# Patient Record
Sex: FEMALE | Race: WHITE
[De-identification: names, ages, dates, MRNs, and addresses within clinical notes are randomized per-mention and may not be internally consistent; named-entity substitution may affect disease eponyms.]

---

## 2020-07-24 ENCOUNTER — HOSPITAL ENCOUNTER (INPATIENT)
Dept: HOSPITAL 54 - ER | Age: 65
LOS: 10 days | Discharge: HOME | DRG: 885 | End: 2020-08-03
Attending: FAMILY MEDICINE | Admitting: PSYCHIATRY & NEUROLOGY
Payer: MEDICARE

## 2020-07-24 VITALS — DIASTOLIC BLOOD PRESSURE: 63 MMHG | SYSTOLIC BLOOD PRESSURE: 124 MMHG

## 2020-07-24 VITALS — SYSTOLIC BLOOD PRESSURE: 122 MMHG | DIASTOLIC BLOOD PRESSURE: 73 MMHG

## 2020-07-24 VITALS — DIASTOLIC BLOOD PRESSURE: 69 MMHG | SYSTOLIC BLOOD PRESSURE: 124 MMHG

## 2020-07-24 VITALS — BODY MASS INDEX: 23.04 KG/M2 | HEIGHT: 63 IN | WEIGHT: 130 LBS

## 2020-07-24 VITALS — SYSTOLIC BLOOD PRESSURE: 132 MMHG | DIASTOLIC BLOOD PRESSURE: 72 MMHG

## 2020-07-24 DIAGNOSIS — E11.65: ICD-10-CM

## 2020-07-24 DIAGNOSIS — R45.851: ICD-10-CM

## 2020-07-24 DIAGNOSIS — F23: ICD-10-CM

## 2020-07-24 DIAGNOSIS — E78.5: ICD-10-CM

## 2020-07-24 DIAGNOSIS — F41.0: ICD-10-CM

## 2020-07-24 DIAGNOSIS — F32.2: Primary | ICD-10-CM

## 2020-07-24 DIAGNOSIS — E87.1: ICD-10-CM

## 2020-07-24 DIAGNOSIS — E03.9: ICD-10-CM

## 2020-07-24 DIAGNOSIS — Z79.84: ICD-10-CM

## 2020-07-24 DIAGNOSIS — T38.3X1D: ICD-10-CM

## 2020-07-24 DIAGNOSIS — E86.0: ICD-10-CM

## 2020-07-24 RX ADMIN — Medication SCH EACH: at 21:19

## 2020-07-24 RX ADMIN — MIRTAZAPINE SCH MG: 15 TABLET, FILM COATED ORAL at 21:18

## 2020-07-24 RX ADMIN — TEMAZEPAM PRN MG: 7.5 CAPSULE ORAL at 21:18

## 2020-07-24 RX ADMIN — SERTRALINE HYDROCHLORIDE SCH MG: 50 TABLET, FILM COATED ORAL at 12:49

## 2020-07-24 RX ADMIN — ATORVASTATIN CALCIUM SCH MG: 10 TABLET, FILM COATED ORAL at 21:18

## 2020-07-24 RX ADMIN — INSULIN HUMAN PRN UNIT: 100 INJECTION, SOLUTION PARENTERAL at 22:40

## 2020-07-24 NOTE — NUR
gps rn note:

NEW T.O. ORDER FROM DR TERRAZAS MILD ACCU CHECK AC/HS ORDER PLACED AND CARED OUT WILL 
CONTINUE MONITORING

## 2020-07-24 NOTE — NUR
GROUP NOTE:  invited pt to participate in group. Pt stated that she is feeling 
dizzy and does not want to join.

## 2020-07-24 NOTE — NUR
Family Contact: SW called the pts daughter, Ninoska ([phone]), and left a voicemail 
stating that the SW would like to discuss the treatment planning and initial discharge plan.

## 2020-07-24 NOTE — NUR
GPS/LVN ADMISSION NOTES:



RECEIVED 64 YR. OLD FEMALE ON A 5150 HOLD FOR DTS.  PT. TRIED TO OVERDOSE ON HER MEDICATION 
METHFORMIN.  PT. A/O X3.  PT. ORIENTED TO UNIT POLICY, PROTOCOLS, AND PROCEDURES.  SAFETY 
ENVIRONMENT OBSERVED AT ALL TIMES WITH CALL BAUM WITHIN REACH AND BED ALARM ON.  PT. DENIES 
SI/HI AT THIS TIME.  MEDICAL AND PSYCH DRAg MADE AWARE OF PT. ADMISSION.  PT. FAMILY ALSO 
NOTIFIED OF PT. ADMISSION TO UNIT.  WILL CONTINUE TO MONITOR Q 15 MIN FOR SAFETY AND 
BEHAVIOR.

## 2020-07-24 NOTE — NUR
Initial Discharge Plan: Pt currently resides at her home located at [address] [place] [address]; ([phone]). Per pt, she would like to return to her home. FRANKLIN will 
work with the MD and the pt regarding appropriate discharge planning. SW will form a safe 
and proper discharge plan.

## 2020-07-24 NOTE — NUR
GPS RN



PT REFUSED INSULIN SLIDING SCALE COVERAGE BLOOD SUGAR  MG/DL DESPITE EXPLAINING RISKS 
AND BENEFITS PER PT "I DONT WANT INSULIN".OFFERED 3 TIMES PT STILL REFUSE

## 2020-07-25 VITALS — DIASTOLIC BLOOD PRESSURE: 61 MMHG | SYSTOLIC BLOOD PRESSURE: 110 MMHG

## 2020-07-25 VITALS — SYSTOLIC BLOOD PRESSURE: 125 MMHG | DIASTOLIC BLOOD PRESSURE: 58 MMHG

## 2020-07-25 VITALS — SYSTOLIC BLOOD PRESSURE: 120 MMHG | DIASTOLIC BLOOD PRESSURE: 67 MMHG

## 2020-07-25 VITALS — DIASTOLIC BLOOD PRESSURE: 58 MMHG | SYSTOLIC BLOOD PRESSURE: 125 MMHG

## 2020-07-25 LAB
BASOPHILS # BLD AUTO: 0 /CMM (ref 0–0.2)
BASOPHILS NFR BLD AUTO: 0.4 % (ref 0–2)
BUN SERPL-MCNC: 12 MG/DL (ref 7–18)
CALCIUM SERPL-MCNC: 9.1 MG/DL (ref 8.5–10.1)
CHLORIDE SERPL-SCNC: 99 MMOL/L (ref 98–107)
CHOLEST SERPL-MCNC: 160 MG/DL (ref ?–200)
CO2 SERPL-SCNC: 25 MMOL/L (ref 21–32)
CREAT SERPL-MCNC: 0.7 MG/DL (ref 0.6–1.3)
EOSINOPHIL NFR BLD AUTO: 0.8 % (ref 0–6)
GLUCOSE SERPL-MCNC: 147 MG/DL (ref 74–106)
HCT VFR BLD AUTO: 35 % (ref 33–45)
HDLC SERPL-MCNC: 40 MG/DL (ref 40–60)
HGB BLD-MCNC: 11.7 G/DL (ref 11.5–14.8)
LDLC SERPL DIRECT ASSAY-MCNC: 109 MG/DL (ref 0–99)
LYMPHOCYTES NFR BLD AUTO: 2.3 /CMM (ref 0.8–4.8)
LYMPHOCYTES NFR BLD AUTO: 25 % (ref 20–44)
MCHC RBC AUTO-ENTMCNC: 33 G/DL (ref 31–36)
MCV RBC AUTO: 92 FL (ref 82–100)
MONOCYTES NFR BLD AUTO: 0.5 /CMM (ref 0.1–1.3)
MONOCYTES NFR BLD AUTO: 5 % (ref 2–12)
NEUTROPHILS # BLD AUTO: 6.3 /CMM (ref 1.8–8.9)
NEUTROPHILS NFR BLD AUTO: 68.8 % (ref 43–81)
PLATELET # BLD AUTO: 285 /CMM (ref 150–450)
POTASSIUM SERPL-SCNC: 3.8 MMOL/L (ref 3.5–5.1)
RBC # BLD AUTO: 3.85 MIL/UL (ref 4–5.2)
SODIUM SERPL-SCNC: 134 MMOL/L (ref 136–145)
TRIGL SERPL-MCNC: 89 MG/DL (ref 30–150)
WBC NRBC COR # BLD AUTO: 9.2 K/UL (ref 4.3–11)

## 2020-07-25 RX ADMIN — SERTRALINE HYDROCHLORIDE SCH MG: 50 TABLET, FILM COATED ORAL at 12:56

## 2020-07-25 RX ADMIN — INSULIN HUMAN PRN UNIT: 100 INJECTION, SOLUTION PARENTERAL at 17:05

## 2020-07-25 RX ADMIN — Medication SCH EACH: at 17:15

## 2020-07-25 RX ADMIN — MIRTAZAPINE SCH MG: 15 TABLET, FILM COATED ORAL at 21:38

## 2020-07-25 RX ADMIN — INSULIN HUMAN PRN UNIT: 100 INJECTION, SOLUTION PARENTERAL at 17:16

## 2020-07-25 RX ADMIN — Medication SCH EACH: at 21:38

## 2020-07-25 RX ADMIN — TEMAZEPAM PRN MG: 7.5 CAPSULE ORAL at 22:46

## 2020-07-25 RX ADMIN — Medication SCH EACH: at 11:31

## 2020-07-25 RX ADMIN — Medication SCH EACH: at 07:30

## 2020-07-25 RX ADMIN — ATORVASTATIN CALCIUM SCH MG: 10 TABLET, FILM COATED ORAL at 21:39

## 2020-07-25 RX ADMIN — INSULIN HUMAN PRN UNIT: 100 INJECTION, SOLUTION PARENTERAL at 17:06

## 2020-07-25 RX ADMIN — INSULIN HUMAN PRN UNIT: 100 INJECTION, SOLUTION PARENTERAL at 11:42

## 2020-07-25 RX ADMIN — MAGNESIUM HYDROXIDE PRN ML: 400 SUSPENSION ORAL at 12:22

## 2020-07-25 NOTE — NUR
GPS RN NOTE: INSOMNIA



PATIENT STATED THAT SHE IS UNABLE TO SLEEP & REQUESTED TO GET SLEEPING MEDICINE. PRN 
RESTORIL 7.5 MG 1 CAP PO GIVEN AS ORDERED.

## 2020-07-25 NOTE — NUR
GPS RN NOTE: REFUSED LIPITOR



PATIENT REFUSED TO TAKE LIPITOR AS SCHEDULED, SAID," I DO NOT WANT TO TAKE IT, MY 
CHOLESTEROL LEVEL IS GOOD." PT. KEPT REFUSING TO TAKE LIPITOR DESPITE OF RISKS & BENEFITS 
EXPLANATIONS.

## 2020-07-25 NOTE — NUR
GPS RN NOTE: REFUSED INSULIN COVERAGE



PATIENT'S BLOOD SUGAR  MG/DL, PATIENT REFUSED 2 UNITS OF SLIDING SCALE INSULIN AS 
ORDERED DESPITE OF RISKS & BENEFITS EXPLANATIONS. PATIENT STATED," IT'S NOT TOO HIGH, IT'S 
NOT TOO LOW, I WILL SEE IN THE MORNING HOW MUCH IS MY FASTING BLOOD SUGAR LEVEL, THEN I WILL 
DECIDE." WILL CONTINUE TO MONITOR THE PATIENT FOR ANY CHANGES.

## 2020-07-26 VITALS — DIASTOLIC BLOOD PRESSURE: 49 MMHG | SYSTOLIC BLOOD PRESSURE: 101 MMHG

## 2020-07-26 VITALS — DIASTOLIC BLOOD PRESSURE: 74 MMHG | SYSTOLIC BLOOD PRESSURE: 134 MMHG

## 2020-07-26 VITALS — DIASTOLIC BLOOD PRESSURE: 66 MMHG | SYSTOLIC BLOOD PRESSURE: 112 MMHG

## 2020-07-26 VITALS — DIASTOLIC BLOOD PRESSURE: 65 MMHG | SYSTOLIC BLOOD PRESSURE: 106 MMHG

## 2020-07-26 RX ADMIN — INSULIN HUMAN PRN UNIT: 100 INJECTION, SOLUTION PARENTERAL at 12:13

## 2020-07-26 RX ADMIN — INSULIN HUMAN PRN UNIT: 100 INJECTION, SOLUTION PARENTERAL at 07:42

## 2020-07-26 RX ADMIN — Medication SCH EACH: at 07:41

## 2020-07-26 RX ADMIN — ATORVASTATIN CALCIUM SCH MG: 10 TABLET, FILM COATED ORAL at 22:00

## 2020-07-26 RX ADMIN — SERTRALINE HYDROCHLORIDE SCH MG: 50 TABLET, FILM COATED ORAL at 13:00

## 2020-07-26 RX ADMIN — Medication SCH EACH: at 22:02

## 2020-07-26 RX ADMIN — INSULIN HUMAN PRN UNIT: 100 INJECTION, SOLUTION PARENTERAL at 17:04

## 2020-07-26 RX ADMIN — Medication SCH EACH: at 12:09

## 2020-07-26 RX ADMIN — TEMAZEPAM PRN MG: 7.5 CAPSULE ORAL at 22:38

## 2020-07-26 RX ADMIN — Medication SCH EACH: at 17:01

## 2020-07-26 RX ADMIN — MIRTAZAPINE SCH MG: 15 TABLET, FILM COATED ORAL at 21:30

## 2020-07-26 NOTE — NUR
RN NOTE: INSULIN REFUSAL

AM ACCUCHECK 138. PT REFUSED 2 UNITS SLIDING SCALE COVERAGE. RISKS AND BENEFITS EXPLAINED. 
PT CONT TO REFUSE X3. WILL MONITOR FOR S/SX OF HYPERGLYCEMIA

## 2020-07-26 NOTE — NUR
RN NOTE: INSULIN REFUSAL

ACCUCHECK 138. PT REFUSED 2 UNITS COVERAGE. EDUCATED PT RE IMPORTANCE OF BLOOD SUGAR 
MANAGEMENT. PT CONT TO REFUSE X 3

## 2020-07-26 NOTE — NUR
GPS RN NOTE: REFUSED LIPITOR



PATIENT REFUSED TO TAKE LIPITOR AS SCHEDULED, SAID. PT. KEPT REFUSING TO TAKE LIPITOR 
DESPITE OF RISKS & BENEFITS EXPLANATIONS.

## 2020-07-26 NOTE — NUR
GPS RN NOTE: REFUSED INSULIN COVERAGE



PATIENT'S BLOOD SUGAR  MG/DL, PATIENT REFUSED 2 UNITS OF SLIDING SCALE INSULIN AS 
ORDERED DESPITE OF RISKS & BENEFITS EXPLANATIONS. WILL CONTINUE TO MONITOR THE PATIENT FOR 
ANY CHANGES.

## 2020-07-27 VITALS — SYSTOLIC BLOOD PRESSURE: 116 MMHG | DIASTOLIC BLOOD PRESSURE: 68 MMHG

## 2020-07-27 VITALS — SYSTOLIC BLOOD PRESSURE: 132 MMHG | DIASTOLIC BLOOD PRESSURE: 77 MMHG

## 2020-07-27 VITALS — DIASTOLIC BLOOD PRESSURE: 68 MMHG | SYSTOLIC BLOOD PRESSURE: 111 MMHG

## 2020-07-27 LAB
BUN SERPL-MCNC: 16 MG/DL (ref 7–18)
CALCIUM SERPL-MCNC: 9 MG/DL (ref 8.5–10.1)
CHLORIDE SERPL-SCNC: 98 MMOL/L (ref 98–107)
CO2 SERPL-SCNC: 27 MMOL/L (ref 21–32)
CREAT SERPL-MCNC: 0.9 MG/DL (ref 0.6–1.3)
GLUCOSE SERPL-MCNC: 139 MG/DL (ref 74–106)
POTASSIUM SERPL-SCNC: 3.7 MMOL/L (ref 3.5–5.1)
SODIUM SERPL-SCNC: 136 MMOL/L (ref 136–145)

## 2020-07-27 RX ADMIN — Medication SCH EACH: at 12:07

## 2020-07-27 RX ADMIN — MIRTAZAPINE SCH MG: 15 TABLET, FILM COATED ORAL at 21:43

## 2020-07-27 RX ADMIN — SERTRALINE HYDROCHLORIDE SCH MG: 50 TABLET, FILM COATED ORAL at 12:46

## 2020-07-27 RX ADMIN — ATORVASTATIN CALCIUM SCH MG: 10 TABLET, FILM COATED ORAL at 21:44

## 2020-07-27 RX ADMIN — INSULIN HUMAN PRN UNIT: 100 INJECTION, SOLUTION PARENTERAL at 21:28

## 2020-07-27 RX ADMIN — Medication SCH EACH: at 21:27

## 2020-07-27 RX ADMIN — Medication SCH EACH: at 16:40

## 2020-07-27 RX ADMIN — INSULIN HUMAN PRN UNIT: 100 INJECTION, SOLUTION PARENTERAL at 07:41

## 2020-07-27 RX ADMIN — Medication SCH EACH: at 07:39

## 2020-07-27 NOTE — NUR
GPS RN NOTES: REFUSED LIPITOR

PT REFUSED LIPITOR DUE AT 2200. EXPLAIN RISKS AND BENEFITS. PT STILL REFUSED X3. CONTINUE TO 
MONITOR.

## 2020-07-27 NOTE — NUR
Substance Abuse Intervention: SW conducted a substance abuse intervention with the pt due to 
her overdose attempt.

## 2020-07-28 VITALS — SYSTOLIC BLOOD PRESSURE: 116 MMHG | DIASTOLIC BLOOD PRESSURE: 62 MMHG

## 2020-07-28 VITALS — SYSTOLIC BLOOD PRESSURE: 117 MMHG | DIASTOLIC BLOOD PRESSURE: 69 MMHG

## 2020-07-28 VITALS — SYSTOLIC BLOOD PRESSURE: 121 MMHG | DIASTOLIC BLOOD PRESSURE: 69 MMHG

## 2020-07-28 VITALS — DIASTOLIC BLOOD PRESSURE: 61 MMHG | SYSTOLIC BLOOD PRESSURE: 107 MMHG

## 2020-07-28 LAB
BUN SERPL-MCNC: 12 MG/DL (ref 7–18)
CALCIUM SERPL-MCNC: 8.9 MG/DL (ref 8.5–10.1)
CHLORIDE SERPL-SCNC: 99 MMOL/L (ref 98–107)
CO2 SERPL-SCNC: 23 MMOL/L (ref 21–32)
CREAT SERPL-MCNC: 0.7 MG/DL (ref 0.6–1.3)
GLUCOSE SERPL-MCNC: 112 MG/DL (ref 74–106)
POTASSIUM SERPL-SCNC: 3.6 MMOL/L (ref 3.5–5.1)
SODIUM SERPL-SCNC: 134 MMOL/L (ref 136–145)

## 2020-07-28 RX ADMIN — Medication SCH EACH: at 16:44

## 2020-07-28 RX ADMIN — ATORVASTATIN CALCIUM SCH MG: 10 TABLET, FILM COATED ORAL at 21:14

## 2020-07-28 RX ADMIN — MIRTAZAPINE SCH MG: 15 TABLET, FILM COATED ORAL at 20:39

## 2020-07-28 RX ADMIN — SERTRALINE HYDROCHLORIDE SCH MG: 50 TABLET, FILM COATED ORAL at 12:13

## 2020-07-28 RX ADMIN — INSULIN HUMAN PRN UNIT: 100 INJECTION, SOLUTION PARENTERAL at 07:32

## 2020-07-28 RX ADMIN — Medication SCH EACH: at 11:46

## 2020-07-28 RX ADMIN — INSULIN HUMAN PRN UNIT: 100 INJECTION, SOLUTION PARENTERAL at 11:48

## 2020-07-28 RX ADMIN — INSULIN HUMAN PRN UNIT: 100 INJECTION, SOLUTION PARENTERAL at 21:09

## 2020-07-28 RX ADMIN — Medication SCH EACH: at 21:08

## 2020-07-28 RX ADMIN — Medication SCH EACH: at 07:31

## 2020-07-28 NOTE — NUR
GROUP NOTE: Group Topic: Depression

SW provided active listening, supportive counseling, and explored alternate coping skills. 
Patient was able to articulate her feelings and shared about her struggles dealing with 
stress, anxiety, and depression. Patient shared about her experiences of getting help and 
appropriate resources.

## 2020-07-28 NOTE — NUR
Family Contact: SW called the pts daughter, Ninoska ([phone]), and informed her that the 
pts hold was upheld and that she will be discharged when the MD thinks it is appropriate.

## 2020-07-28 NOTE — NUR
GPS RN NOTES: REFUSED LIPITOR

PT REFUSED LIPITOR DUE AT 2200. EXPLAIN RISKS AND BENEFITS. PT STILL REFUSED X3. CONTINUE TO 
MONITOR. PT BLOOD SUGAR 142. PT REFUSED INSULIN COVERAGE AND SNACKS AT NIGHT . PT STATED SHE 
KNOWS SHE WILL NOT EAT SNACKS AT NIGHT AND DOES NOT WANT HER BLOOD SUGAR TO DROP. EXPLAIN 
RISKS AND BENEFITS. PT STILL REFUSED X3.

## 2020-07-28 NOTE — NUR
Probable Cause (PC) Hearing Notification: FRANKLIN called the pts daughter, Ninoska ([phone]), 
and informed her about the hearing and what it entails. FRANKLIN stated that she would call her 
back after it is concluded with the results.

## 2020-07-28 NOTE — NUR
Family Contact: Pts daughter, Ninoska ([phone]), called the SW and stated that she wanted 
to confirm the discharge plan for the pt. She stated that the pt would like to return to her 
home with her daughter and the daughter expressed that she was nervous because the pt had 
made this attempt while she left for 30 minutes. Pts daughter admitted that the pt appears 
to be doing better and SW stated that she will need to keep the pt on the medication regimen 
to keep her stable. Pts daughter stated that she would now that she understands how 
important it is. SW stated that she will call her back once she has a discharge date for the 
pt.

## 2020-07-28 NOTE — NUR
Partial Program Referral: SW faxed a referral to [facility] with 
attn to [facility] to the fax number: [phone].

## 2020-07-29 VITALS — SYSTOLIC BLOOD PRESSURE: 128 MMHG | DIASTOLIC BLOOD PRESSURE: 72 MMHG

## 2020-07-29 VITALS — DIASTOLIC BLOOD PRESSURE: 70 MMHG | SYSTOLIC BLOOD PRESSURE: 112 MMHG

## 2020-07-29 VITALS — SYSTOLIC BLOOD PRESSURE: 140 MMHG | DIASTOLIC BLOOD PRESSURE: 79 MMHG

## 2020-07-29 RX ADMIN — SERTRALINE HYDROCHLORIDE SCH MG: 25 TABLET, FILM COATED ORAL at 16:56

## 2020-07-29 RX ADMIN — ATORVASTATIN CALCIUM SCH MG: 10 TABLET, FILM COATED ORAL at 21:49

## 2020-07-29 RX ADMIN — Medication SCH EACH: at 21:03

## 2020-07-29 RX ADMIN — MIRTAZAPINE SCH MG: 15 TABLET, FILM COATED ORAL at 21:02

## 2020-07-29 RX ADMIN — INSULIN HUMAN PRN UNIT: 100 INJECTION, SOLUTION PARENTERAL at 21:03

## 2020-07-29 RX ADMIN — Medication SCH EACH: at 08:02

## 2020-07-29 RX ADMIN — Medication SCH EACH: at 12:09

## 2020-07-29 RX ADMIN — LINAGLIPTIN SCH MG: 5 TABLET, FILM COATED ORAL at 10:23

## 2020-07-29 RX ADMIN — SERTRALINE HYDROCHLORIDE SCH MG: 50 TABLET, FILM COATED ORAL at 13:21

## 2020-07-29 RX ADMIN — INSULIN HUMAN PRN UNIT: 100 INJECTION, SOLUTION PARENTERAL at 12:10

## 2020-07-29 RX ADMIN — MAGNESIUM HYDROXIDE PRN ML: 400 SUSPENSION ORAL at 15:03

## 2020-07-29 RX ADMIN — Medication SCH EACH: at 16:48

## 2020-07-29 NOTE — NUR
Group Note: SW encouraged the pt to attend group therapy on 7/29/20 on the topic of 
discharge planning. Pt stated that she was feeling really tired due to the new medications 
that the MD put her on and she stated that she was seeing visible spots due to the 
medications. SW informed her that she should continue to rest if that was the case. SW 
stated that she will speak to her individually regarding her discharge. SW informed her that 
she will be discharged on Monday due to the new medications and needing to make sure that 
she is stable. Pt stated that she wants to go back home and that she was really looking 
forward to it and the SW stated that she needs to remain patient and realize that the MD 
will discharge her when she is stable.

## 2020-07-30 VITALS — SYSTOLIC BLOOD PRESSURE: 131 MMHG | DIASTOLIC BLOOD PRESSURE: 75 MMHG

## 2020-07-30 VITALS — DIASTOLIC BLOOD PRESSURE: 74 MMHG | SYSTOLIC BLOOD PRESSURE: 124 MMHG

## 2020-07-30 VITALS — SYSTOLIC BLOOD PRESSURE: 125 MMHG | DIASTOLIC BLOOD PRESSURE: 72 MMHG

## 2020-07-30 RX ADMIN — LINAGLIPTIN SCH MG: 5 TABLET, FILM COATED ORAL at 08:31

## 2020-07-30 RX ADMIN — Medication SCH EACH: at 21:23

## 2020-07-30 RX ADMIN — Medication SCH EACH: at 07:30

## 2020-07-30 RX ADMIN — Medication SCH EACH: at 16:55

## 2020-07-30 RX ADMIN — ATORVASTATIN CALCIUM SCH MG: 10 TABLET, FILM COATED ORAL at 21:10

## 2020-07-30 RX ADMIN — Medication SCH EACH: at 11:54

## 2020-07-30 RX ADMIN — SERTRALINE HYDROCHLORIDE SCH MG: 25 TABLET, FILM COATED ORAL at 16:50

## 2020-07-30 RX ADMIN — INSULIN HUMAN PRN UNIT: 100 INJECTION, SOLUTION PARENTERAL at 08:36

## 2020-07-30 RX ADMIN — MIRTAZAPINE SCH MG: 15 TABLET, FILM COATED ORAL at 21:10

## 2020-07-30 RX ADMIN — SERTRALINE HYDROCHLORIDE SCH MG: 50 TABLET, FILM COATED ORAL at 12:02

## 2020-07-30 NOTE — NUR
Family Contact: FRANKLIN called the pts daughter, Ninoska ([phone]), and informed her that the 
pt is going to be discharged on Monday. FRANKLIN stated that she is still working on finding a 
psychiatrist.

## 2020-07-30 NOTE — NUR
GROUP NOTE: Topic: Adapting to our environment.  provided active listening, 
motivational interviewing, and reflected patient's thoughts and feelings. Patient shared 
about her concerns regarding medications. Patient stated that she is feeling good and does 
not feel the need to continue taking her medications when she return home from the hospital. 
Patient stated that her daughter stated "don't take the medications so you don't get 
addicted". Patient was worried about this statement.  helped patient understand 
"mental health" and the importance of medications. Patient was able to reflect and 
articulate a better understanding.

## 2020-07-31 VITALS — SYSTOLIC BLOOD PRESSURE: 129 MMHG | DIASTOLIC BLOOD PRESSURE: 72 MMHG

## 2020-07-31 VITALS — DIASTOLIC BLOOD PRESSURE: 72 MMHG | SYSTOLIC BLOOD PRESSURE: 113 MMHG

## 2020-07-31 VITALS — DIASTOLIC BLOOD PRESSURE: 65 MMHG | SYSTOLIC BLOOD PRESSURE: 112 MMHG

## 2020-07-31 VITALS — DIASTOLIC BLOOD PRESSURE: 72 MMHG | SYSTOLIC BLOOD PRESSURE: 129 MMHG

## 2020-07-31 RX ADMIN — LINAGLIPTIN SCH MG: 5 TABLET, FILM COATED ORAL at 09:40

## 2020-07-31 RX ADMIN — SERTRALINE HYDROCHLORIDE SCH MG: 50 TABLET, FILM COATED ORAL at 12:11

## 2020-07-31 RX ADMIN — MIRTAZAPINE SCH MG: 15 TABLET, FILM COATED ORAL at 21:02

## 2020-07-31 RX ADMIN — Medication SCH EACH: at 07:40

## 2020-07-31 RX ADMIN — Medication SCH EACH: at 11:45

## 2020-07-31 RX ADMIN — Medication SCH EACH: at 21:18

## 2020-07-31 RX ADMIN — ATORVASTATIN CALCIUM SCH MG: 10 TABLET, FILM COATED ORAL at 21:02

## 2020-07-31 RX ADMIN — INSULIN HUMAN PRN UNIT: 100 INJECTION, SOLUTION PARENTERAL at 08:12

## 2020-07-31 RX ADMIN — Medication SCH EACH: at 17:14

## 2020-07-31 RX ADMIN — SERTRALINE HYDROCHLORIDE SCH MG: 25 TABLET, FILM COATED ORAL at 17:33

## 2020-07-31 NOTE — NUR
PER DOCTOR DAVISON, PT'S DAUGHTER PAIGE, REQUESTED TO HAVE DR ORTIZ CALL HER TO PROVIDE 
UPDATE ON PT'S MEDICATION REGIMEN. DOCTOR ORTIZ MADE AWARE. NO NEW ORDERS, WILL CONTINUE 
TO MONITOR

## 2020-07-31 NOTE — NUR
GROUP NOTE: Topic: Learning coping skills.  attempted to invite patient to 
participate in group. Patient was asleep at this time and unable to attend.

## 2020-07-31 NOTE — NUR
GPS RN OPENING NOTES





RECEIVED PATIENT RESTING IN BED AT THIS TIME, AOX4, Iranian SPEAKING, NO SOB NOTED, NO S/S 
OF ANY ACUTE DISTRESS NOTED, NO C/O PAIN AT THIS TIME. COOPERATIVE WITH CARE AND EASILY 
REDIRECTED. PATIENT GETS ANXIOUS AT TIMES. PT DENIES SI/HI/AVH AT THIS TIME. ENVIRONMENTAL

SAFETY CHECKS . SAFETY PRECAUTIONS IN PLACE, BED IN LOWEST LOCKED POSITION, SIDE RAILS UP, 
CALL LIGHT WITHIN REACH. WILL CONTINUE TO MONITOR Q 15 MIN, FOR SAFETY, MOOD,

BEHAVIOR AND CONTINUE WITH PLAN OF CARE

## 2020-07-31 NOTE — NUR
GPS RN CLOSING NOTES 



PATIENT AWAKE IN BED AT THIS TIME. PT REMAINED STABLE THROUGHOUT SHIFT. PT KEPT CLEAN AND 
DRY. ALL CARE, NEEDS, MEDICATIONS AND TREATMENT ADMINISTERED AS  ANTICIPATED PER ORDER. 
SAFETY PRECAUTIONS IN PLACE AND MAINTAINED AT ALL TIMES, BED IN LOWEST LOCKED POSITION, SIDE 
RAILS UP, CALL LIGHT WITHIN REACH. WILL ENDORSE TO NIGHT SHIFT NURSE FOR VETO

## 2020-08-01 VITALS — DIASTOLIC BLOOD PRESSURE: 63 MMHG | SYSTOLIC BLOOD PRESSURE: 129 MMHG

## 2020-08-01 VITALS — SYSTOLIC BLOOD PRESSURE: 124 MMHG | DIASTOLIC BLOOD PRESSURE: 70 MMHG

## 2020-08-01 VITALS — SYSTOLIC BLOOD PRESSURE: 116 MMHG | DIASTOLIC BLOOD PRESSURE: 67 MMHG

## 2020-08-01 RX ADMIN — ATORVASTATIN CALCIUM SCH MG: 10 TABLET, FILM COATED ORAL at 21:29

## 2020-08-01 RX ADMIN — SERTRALINE HYDROCHLORIDE SCH MG: 50 TABLET, FILM COATED ORAL at 12:35

## 2020-08-01 RX ADMIN — Medication SCH EACH: at 21:23

## 2020-08-01 RX ADMIN — ATORVASTATIN CALCIUM SCH MG: 10 TABLET, FILM COATED ORAL at 21:14

## 2020-08-01 RX ADMIN — INSULIN HUMAN PRN UNIT: 100 INJECTION, SOLUTION PARENTERAL at 08:23

## 2020-08-01 RX ADMIN — Medication SCH EACH: at 11:47

## 2020-08-01 RX ADMIN — MIRTAZAPINE SCH MG: 15 TABLET, FILM COATED ORAL at 21:14

## 2020-08-01 RX ADMIN — SERTRALINE HYDROCHLORIDE SCH MG: 25 TABLET, FILM COATED ORAL at 17:33

## 2020-08-01 RX ADMIN — LINAGLIPTIN SCH MG: 5 TABLET, FILM COATED ORAL at 09:21

## 2020-08-01 RX ADMIN — Medication SCH EACH: at 17:32

## 2020-08-01 RX ADMIN — Medication SCH EACH: at 07:57

## 2020-08-01 NOTE — NUR
PS RN NOTE



NO  BEHAVIOR PROBLEMS  NOTED. NO ACUTE DISTRESS NOTED. WILL CONTINUE TO MONITOR FOR  SAFETY 
AND BEHAVIOR .

## 2020-08-01 NOTE — NUR
GPS RN NOTE



NO  BEHAVIOR PROBLEMS  NOTED. NO ACUTE DISTRESS NOTED. WILL CONTINUE TO MONITOR FOR  SAFETY 
AND BEHAVIOR .

## 2020-08-01 NOTE — NUR
GPS-RN NOTE: MEDICATION REFUSAL 



PATIENT REFUSED SCHEDULED LIPITOR FOR TONIGHT. EDUCATED PT REGARDING IMPORTANCE OF MED 
COMPLIANCE. PT CONTINUED TO REFUSE X3.

## 2020-08-02 VITALS — DIASTOLIC BLOOD PRESSURE: 58 MMHG | SYSTOLIC BLOOD PRESSURE: 125 MMHG

## 2020-08-02 VITALS — DIASTOLIC BLOOD PRESSURE: 74 MMHG | SYSTOLIC BLOOD PRESSURE: 126 MMHG

## 2020-08-02 VITALS — DIASTOLIC BLOOD PRESSURE: 71 MMHG | SYSTOLIC BLOOD PRESSURE: 124 MMHG

## 2020-08-02 RX ADMIN — Medication SCH EACH: at 16:50

## 2020-08-02 RX ADMIN — MIRTAZAPINE SCH MG: 15 TABLET, FILM COATED ORAL at 21:21

## 2020-08-02 RX ADMIN — INSULIN HUMAN PRN UNIT: 100 INJECTION, SOLUTION PARENTERAL at 21:26

## 2020-08-02 RX ADMIN — LINAGLIPTIN SCH MG: 5 TABLET, FILM COATED ORAL at 08:57

## 2020-08-02 RX ADMIN — ATORVASTATIN CALCIUM SCH MG: 10 TABLET, FILM COATED ORAL at 21:27

## 2020-08-02 RX ADMIN — Medication SCH EACH: at 11:53

## 2020-08-02 RX ADMIN — INSULIN HUMAN PRN UNIT: 100 INJECTION, SOLUTION PARENTERAL at 17:01

## 2020-08-02 RX ADMIN — INSULIN HUMAN PRN UNIT: 100 INJECTION, SOLUTION PARENTERAL at 11:54

## 2020-08-02 RX ADMIN — Medication SCH EACH: at 07:23

## 2020-08-02 RX ADMIN — SERTRALINE HYDROCHLORIDE SCH MG: 25 TABLET, FILM COATED ORAL at 16:50

## 2020-08-02 RX ADMIN — Medication SCH EACH: at 21:26

## 2020-08-02 RX ADMIN — SERTRALINE HYDROCHLORIDE SCH MG: 50 TABLET, FILM COATED ORAL at 13:02

## 2020-08-02 NOTE — NUR
RN OPENING NOTES

RECEIVED PATIENT IN BED AWAKE, ALERT AND ORIENTED X3. NO ACUTE DISTRESS

NOTED.NO SOB NOTED, DENIES PAIN AT THIS TIME, APPEARS  CALM, COOPERATIVE

TO CARE, MED COMPLIANT, WITH SOCIAL INTERACTIONS

WITH PEERS/STAFF. DENIES SI/HI/AVH AT THIS TIME. ALL NEEDS ATTENDED AND

ANTICIPATED. SAFETY MEASURES  IMPLEMENTED. WILL CONTINUE TO MONITOR

Q15MIN ROUNDS FOR SAFETY AND BEHAVIOR.

## 2020-08-02 NOTE — NUR
GPS RN NOTES: REFUSED LIPITOR

PT REFUSED LIPITOR DUE AT 2200. EXPLAIN RISKS AND BENEFITS. PT STILL REFUSED X3. CONTINUE TO 
MONITOR. PT BLOOD SUGAR 104. PER SLIDING SCALE, NO INSULIN. CONTINUE TO MONITOR

## 2020-08-03 VITALS — SYSTOLIC BLOOD PRESSURE: 126 MMHG | DIASTOLIC BLOOD PRESSURE: 69 MMHG

## 2020-08-03 RX ADMIN — Medication SCH EACH: at 07:40

## 2020-08-03 RX ADMIN — LINAGLIPTIN SCH MG: 5 TABLET, FILM COATED ORAL at 08:31

## 2020-08-03 RX ADMIN — INSULIN HUMAN PRN UNIT: 100 INJECTION, SOLUTION PARENTERAL at 07:45

## 2020-08-03 RX ADMIN — Medication SCH EACH: at 11:56

## 2020-08-03 NOTE — NUR
RN DISCHARGE NOTE: 64 YEAR OLD FEMALE DISCHARGED HOME IN STABLE CONDITION. PT COMPLIANT WITH 
MEDICATION ADMINISTRATION, COOPERATIVE WITH TREATMENT PLAN AND CARE. PATIENT DENIES SI/HI 
AND DEPRESSION AT TIME OF DISCHARGE. INSTRUCTED TO GO THE CLOSEST ER IF DEVELOPING SI/HI. 
PT'S BEHAVIOR AND DEPRESSION IMPROVED, PSYCHIATRIC TREAMTNE PLANS MET, MEDICAL TREATMENT 
PLANS DEFERRED FOR CONTINUAL MONITORING. EDUCATED PT ABOUT AFTER CARE, EXPLAINED FOLLOW UP 
APPOINTMENTS AND PRESCRIPTIONS. COPY PROVIDED. RETURNED PERSONAL BELONGINGS. PATIENT ID BAND 
REMOVED. DAUGHTERPAIGE, SIGNED RESPONSIBILITY OF CARE FORM. AFTERCARE AND PRESCRIPTIONS 
EXPLAINED TO DAUGHTER. MEDICATIONS RECONCILED WITH DR. DAVISON AND DR. ORTIZ. PATIENT 
SIGNED DISCHARGE PAPERWORK. PATIENT LEFT THE UNIT IN STABLE CONDITION VIA AMBULATION AT 
12:15.

## 2020-08-03 NOTE — NUR
Discharge Note: Pt was discharged to her home located at [address] [place] [address]; 
([phone]). Pts daughter, Ninoska ([phone]), picked the pt up 11AM. Upon discharge, 
the pt appeared to be in a euthymic mood and presented with a calm affect. Pt appeared to be 
alert and oriented x4 (time, place, self and situation). Pt denied both suicidal and 
homicidal ideation as well as auditory and visual hallucinations. Pt stated that she will 
never do this again because she has a better understanding of her actions. Pt was provided 
with substance abuse referrals as well as hotline resources at the time of discharge. Pt 
will receive psychiatric assistance at [facility] located at [address] [place] [id number]; [phone]; and a fax of records was sent to: [phone]. Pt will follow up with her internist, Dr. Cast, located at [address] [place], [place], [address]; [phone]. Pt has an appointment scheduled for 8/12/20 at 2pm.

## 2020-08-03 NOTE — NUR
Family Contact: SW called the pts daughter, Ninoska ([phone]), and confirmed with her that 
she will be picking the pt up around 11am.